# Patient Record
Sex: MALE | Race: WHITE | NOT HISPANIC OR LATINO | Employment: FULL TIME | URBAN - METROPOLITAN AREA
[De-identification: names, ages, dates, MRNs, and addresses within clinical notes are randomized per-mention and may not be internally consistent; named-entity substitution may affect disease eponyms.]

---

## 2025-02-13 ENCOUNTER — APPOINTMENT (OUTPATIENT)
Dept: PRIMARY CARE | Facility: CLINIC | Age: 60
End: 2025-02-13
Payer: COMMERCIAL

## 2025-02-13 VITALS
RESPIRATION RATE: 19 BRPM | TEMPERATURE: 97.3 F | DIASTOLIC BLOOD PRESSURE: 72 MMHG | SYSTOLIC BLOOD PRESSURE: 120 MMHG | HEIGHT: 78 IN | OXYGEN SATURATION: 98 % | HEART RATE: 90 BPM | BODY MASS INDEX: 25.52 KG/M2 | WEIGHT: 220.6 LBS

## 2025-02-13 DIAGNOSIS — E66.3 OVERWEIGHT (BMI 25.0-29.9): ICD-10-CM

## 2025-02-13 DIAGNOSIS — Z23 IMMUNIZATION DUE: ICD-10-CM

## 2025-02-13 DIAGNOSIS — Z00.00 ANNUAL PHYSICAL EXAM: Primary | ICD-10-CM

## 2025-02-13 DIAGNOSIS — Z00.00 HEALTH CARE MAINTENANCE: ICD-10-CM

## 2025-02-13 DIAGNOSIS — Z12.11 ENCOUNTER FOR SCREENING FOR MALIGNANT NEOPLASM OF COLON: ICD-10-CM

## 2025-02-13 PROCEDURE — 99396 PREV VISIT EST AGE 40-64: CPT

## 2025-02-13 PROCEDURE — 90750 HZV VACC RECOMBINANT IM: CPT

## 2025-02-13 PROCEDURE — 1036F TOBACCO NON-USER: CPT

## 2025-02-13 PROCEDURE — 90471 IMMUNIZATION ADMIN: CPT

## 2025-02-13 PROCEDURE — 3008F BODY MASS INDEX DOCD: CPT

## 2025-02-13 ASSESSMENT — PATIENT HEALTH QUESTIONNAIRE - PHQ9
2. FEELING DOWN, DEPRESSED OR HOPELESS: NOT AT ALL
SUM OF ALL RESPONSES TO PHQ9 QUESTIONS 1 AND 2: 0
1. LITTLE INTEREST OR PLEASURE IN DOING THINGS: NOT AT ALL

## 2025-02-13 NOTE — PATIENT INSTRUCTIONS
Fasting bloodwork  2nd shingles shot here for a nurse visit or at your local pharmacy   Colonoscopy in July

## 2025-02-13 NOTE — PROGRESS NOTES
"Subjective   Chevy Miller is a 59 y.o. male     ANNUAL PHYSICAL EXAM    Specialists that pt follows with: None    Preventative:  - Immunizations: UTD on covid x4, Tdap (2022). Doesn't get flu shots. 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  They are giving him 6 months of severance pay.  He was going to retire in a couple years anyway so he may not try to find a new job unless something good presents itself.  He is in good spirits overall about the situation  - Diet: Well balanced  - Exercise: Regularly  - Alcohol/tobacco/drugs: Occasional social alcohol use, no tobacco/nicotine/drugs  - Mood: Good    Active Problem List      Comprehensive Medical/Surgical/Social/Family History  Past Medical History:   Diagnosis Date    Encounter for screening for malignant neoplasm of colon 09/13/2021    Colon cancer screening     Past Surgical History:   Procedure Laterality Date    OTHER SURGICAL HISTORY  09/13/2021    Tonsillectomy    OTHER SURGICAL HISTORY  09/13/2021    Jaw surgery     Social History     Social History Narrative    Not on file       Allergies and Medications  Patient has no known allergies.  No current outpatient medications on file prior to visit.     No current facility-administered medications on file prior to visit.       Objective   /72   Pulse 90   Temp 36.3 °C (97.3 °F)   Resp 19   Ht 1.981 m (6' 6\")   Wt 100 kg (220 lb 9.6 oz)   SpO2 98%   BMI 25.49 kg/m²    PHYSICAL EXAM  Gen: Well appearing, in NAD  Eyes: EOMI  HEENT: MMM. TMs normal. Throat normal.  Heart: RRR, no murmurs  Lungs: No increased work of breathing, CTAB, on RA  GI: Soft, NTND, no guarding or rebound  Extremities: WWP, cap " refill <2sec, no pitting edema in LE b/l  Neuro: Alert, symmetrical facies, moves all extremities equally  Skin: No rashes or lesions  Psych: Appropriate mood and affect    Assessment/Plan   - Reviewed Social Determinants of health with patient. Discussed healthy lifestyle, including 150 minutes of physical activity per week.  - Ordered/Reviewed baseline labwork   - Immunizations up to date  - Follow up in 1 year for next annual physical or sooner for acute concerns    Problem List Items Addressed This Visit    None  Visit Diagnoses       Annual physical exam    -  Primary    Health care maintenance        Relevant Orders    Vitamin D 25-Hydroxy,Total (for eval of Vitamin D levels)    Vitamin B12    Hemoglobin A1C    Prostate Specific Antigen    Immunization due        Relevant Orders    Zoster vaccine, recombinant, adult (SHINGRIX) (Completed)    Encounter for screening for malignant neoplasm of colon        Relevant Orders    Colonoscopy Screening; Average Risk Patient    Overweight (BMI 25.0-29.9)        Relevant Orders    CBC    Comprehensive Metabolic Panel    Lipid Panel    TSH with reflex to Free T4 if abnormal          Franci Gordon D.O.  Family Medicine Physician  [facility]  [address] [place] [address] [phone]    This note has been transcribed using Dragon voice recognition system and there is a possibility of unintentional typing misprints.

## 2025-02-15 LAB
25(OH)D3+25(OH)D2 SERPL-MCNC: 11 NG/ML (ref 30–100)
ALBUMIN SERPL-MCNC: 4.6 G/DL (ref 3.6–5.1)
ALP SERPL-CCNC: 70 U/L (ref 35–144)
ALT SERPL-CCNC: 11 U/L (ref 9–46)
ANION GAP SERPL CALCULATED.4IONS-SCNC: 9 MMOL/L (CALC) (ref 7–17)
AST SERPL-CCNC: 17 U/L (ref 10–35)
BILIRUB SERPL-MCNC: 1.1 MG/DL (ref 0.2–1.2)
BUN SERPL-MCNC: 13 MG/DL (ref 7–25)
CALCIUM SERPL-MCNC: 9.2 MG/DL (ref 8.6–10.3)
CHLORIDE SERPL-SCNC: 104 MMOL/L (ref 98–110)
CHOLEST SERPL-MCNC: 200 MG/DL
CHOLEST/HDLC SERPL: 3.3 (CALC)
CO2 SERPL-SCNC: 28 MMOL/L (ref 20–32)
CREAT SERPL-MCNC: 0.92 MG/DL (ref 0.7–1.3)
EGFRCR SERPLBLD CKD-EPI 2021: 97 ML/MIN/1.73M2
ERYTHROCYTE [DISTWIDTH] IN BLOOD BY AUTOMATED COUNT: 12.5 % (ref 11–15)
EST. AVERAGE GLUCOSE BLD GHB EST-MCNC: 114 MG/DL
EST. AVERAGE GLUCOSE BLD GHB EST-SCNC: 6.3 MMOL/L
GLUCOSE SERPL-MCNC: 91 MG/DL (ref 65–99)
HBA1C MFR BLD: 5.6 % OF TOTAL HGB
HCT VFR BLD AUTO: 41.8 % (ref 38.5–50)
HDLC SERPL-MCNC: 60 MG/DL
HGB BLD-MCNC: 14 G/DL (ref 13.2–17.1)
LDLC SERPL CALC-MCNC: 125 MG/DL (CALC)
MCH RBC QN AUTO: 29.1 PG (ref 27–33)
MCHC RBC AUTO-ENTMCNC: 33.5 G/DL (ref 32–36)
MCV RBC AUTO: 86.9 FL (ref 80–100)
NONHDLC SERPL-MCNC: 140 MG/DL (CALC)
PLATELET # BLD AUTO: 287 THOUSAND/UL (ref 140–400)
PMV BLD REES-ECKER: 9.8 FL (ref 7.5–12.5)
POTASSIUM SERPL-SCNC: 4.2 MMOL/L (ref 3.5–5.3)
PROT SERPL-MCNC: 7 G/DL (ref 6.1–8.1)
PSA SERPL-MCNC: 0.42 NG/ML
RBC # BLD AUTO: 4.81 MILLION/UL (ref 4.2–5.8)
SODIUM SERPL-SCNC: 141 MMOL/L (ref 135–146)
TRIGL SERPL-MCNC: 63 MG/DL
TSH SERPL-ACNC: 1.95 MIU/L (ref 0.4–4.5)
VIT B12 SERPL-MCNC: 260 PG/ML (ref 200–1100)
WBC # BLD AUTO: 7.9 THOUSAND/UL (ref 3.8–10.8)

## 2025-02-17 DIAGNOSIS — E55.9 VITAMIN D DEFICIENCY: ICD-10-CM

## 2025-02-17 DIAGNOSIS — E78.2 MIXED HYPERLIPIDEMIA: Primary | ICD-10-CM

## 2025-02-18 ENCOUNTER — TELEPHONE (OUTPATIENT)
Dept: PRIMARY CARE | Facility: CLINIC | Age: 60
End: 2025-02-18
Payer: COMMERCIAL

## 2025-02-18 NOTE — TELEPHONE ENCOUNTER
----- Message from Farnci Gordon sent at 2/17/2025  9:13 AM EST -----  Please call patient with the following message.  Your vitamin D level is extremely low.  If you are not taking any already, I want you to take an over-the-counter vitamin D 5,000 international units daily.  Your blood counts are normal with no sign of infection, inflammation, anemia, or blood clotting problems.  Your fasting blood sugar, electrolytes, kidney function, and liver function are normal.  Your thyroid function is normal.  Your vitamin B12 level is normal.  Your hemoglobin A1c, which is a 3-month blood sugar average, is normal with no diabetes or prediabetes.  However your hemoglobin A1c is 5.6%, and if it goes up by 0.1% and becomes 5.7%, he will technically be in the prediabetes category.  Please be sure to watch your carbohydrate/sugar in your diet.  Your prostate number is normal.  Your bad cholesterol AKA LDL is a little bit elevated.  However we do not necessarily need to start you on a cholesterol-lowering pill such as a statin at this time. Try to make the following adjustments to your diet to help lower your cholesterol naturally. Avoid trans fats (fried foods, packaged snacks). Limit saturated fats (red meat, butter, cheese, full-fat dairy products). Increase unsaturated fats (olive oil, avocado, nuts, seeds, fish.) Eat more fiber (oats, bran, barley, beans/lentils, apples, oranges, berries, carrots, brussels sprouts). Choose whole grains like brown rice, quinoa, and whole-wheat bread. I also like fiber gummies and benefiber powder.  Let me know if you have additional questions.  Thanks!

## 2025-02-25 ENCOUNTER — APPOINTMENT (OUTPATIENT)
Dept: PRIMARY CARE | Facility: CLINIC | Age: 60
End: 2025-02-25
Payer: COMMERCIAL